# Patient Record
Sex: FEMALE | Race: WHITE | NOT HISPANIC OR LATINO | Employment: UNEMPLOYED | URBAN - METROPOLITAN AREA
[De-identification: names, ages, dates, MRNs, and addresses within clinical notes are randomized per-mention and may not be internally consistent; named-entity substitution may affect disease eponyms.]

---

## 2019-11-01 PROBLEM — L30.9 DERMATITIS: Status: ACTIVE | Noted: 2019-11-01

## 2019-11-01 PROBLEM — Z98.891 S/P EMERGENCY CESAREAN SECTION: Status: ACTIVE | Noted: 2019-11-01

## 2019-11-18 ENCOUNTER — SOCIAL WORK (OUTPATIENT)
Dept: ADMINISTRATIVE | Facility: OTHER | Age: 19
End: 2019-11-18

## 2019-11-18 NOTE — PROGRESS NOTES
SW received paperwork from [facility] regarding prior authorization for pt's medication(Eliquis). SW completed demographic sheet of the prior authorization form and gave the form to the MD for review/signature. JESSICA later received pt's completed prior authorization form from MD and fax to([phone])[facility]. No other needs identified at this time.    Dunia RomeroMSW  Pager#:[phone]

## 2019-11-18 NOTE — PROGRESS NOTES
SW received paperwork from pt's insurance for medication(Eliquis);per paperwork this medication does not require a prior authorization. SW made phone call to pt's pharmacy([phone]) spoke with [place]-pharmacy representative regarding no prior authorization was needed. [facility] representative informed SW that the dose on the prescription was for 10 mg and it only come in 5 mg. SW was informed by [facility] representative that the prescription will only last 7 days and will be to soon to fill. [facility] representative stated they will provide pt a discount card to help with the cost of medication. No other needs identified at this time.    Dunia RomeroMSW  Pager#[phone]

## 2019-12-06 PROBLEM — Z86.711 HISTORY OF PULMONARY EMBOLISM: Status: ACTIVE | Noted: 2019-12-06

## 2019-12-16 PROBLEM — I26.99 ACUTE PULMONARY EMBOLISM WITHOUT ACUTE COR PULMONALE: Status: ACTIVE | Noted: 2019-12-06

## 2020-01-13 PROBLEM — I26.99 PULMONARY EMBOLISM: Status: ACTIVE | Noted: 2020-01-13

## 2020-03-16 PROBLEM — D50.9 IRON DEFICIENCY ANEMIA OF PREGNANCY: Status: ACTIVE | Noted: 2020-03-16

## 2020-03-16 PROBLEM — O99.019 IRON DEFICIENCY ANEMIA OF PREGNANCY: Status: ACTIVE | Noted: 2020-03-16

## 2021-07-28 PROBLEM — Z86.711 HISTORY OF PULMONARY EMBOLISM: Status: ACTIVE | Noted: 2021-07-28

## 2021-07-28 PROBLEM — O99.213 OBESITY AFFECTING PREGNANCY IN THIRD TRIMESTER: Status: ACTIVE | Noted: 2021-07-28
